# Patient Record
Sex: MALE | Race: OTHER | Employment: FULL TIME | URBAN - METROPOLITAN AREA
[De-identification: names, ages, dates, MRNs, and addresses within clinical notes are randomized per-mention and may not be internally consistent; named-entity substitution may affect disease eponyms.]

---

## 2019-05-29 ENCOUNTER — APPOINTMENT (OUTPATIENT)
Dept: GENERAL RADIOLOGY | Facility: HOSPITAL | Age: 54
DRG: 603 | End: 2019-05-29
Attending: EMERGENCY MEDICINE
Payer: COMMERCIAL

## 2019-05-29 ENCOUNTER — APPOINTMENT (OUTPATIENT)
Dept: CT IMAGING | Facility: HOSPITAL | Age: 54
DRG: 603 | End: 2019-05-29
Attending: EMERGENCY MEDICINE
Payer: COMMERCIAL

## 2019-05-29 ENCOUNTER — HOSPITAL ENCOUNTER (INPATIENT)
Facility: HOSPITAL | Age: 54
LOS: 4 days | Discharge: HOME HEALTH | DRG: 603 | End: 2019-06-03
Attending: EMERGENCY MEDICINE | Admitting: HOSPITALIST
Payer: COMMERCIAL

## 2019-05-29 DIAGNOSIS — M13.0 POLYARTHRITIS: Primary | ICD-10-CM

## 2019-05-29 DIAGNOSIS — R51.9 GENERALIZED HEADACHE: ICD-10-CM

## 2019-05-29 DIAGNOSIS — M54.2 NECK PAIN: ICD-10-CM

## 2019-05-29 PROCEDURE — 009U3ZX DRAINAGE OF SPINAL CANAL, PERCUTANEOUS APPROACH, DIAGNOSTIC: ICD-10-PCS | Performed by: EMERGENCY MEDICINE

## 2019-05-29 PROCEDURE — 70450 CT HEAD/BRAIN W/O DYE: CPT | Performed by: EMERGENCY MEDICINE

## 2019-05-29 PROCEDURE — 99222 1ST HOSP IP/OBS MODERATE 55: CPT | Performed by: HOSPITALIST

## 2019-05-29 PROCEDURE — 71045 X-RAY EXAM CHEST 1 VIEW: CPT | Performed by: EMERGENCY MEDICINE

## 2019-05-29 RX ORDER — MORPHINE SULFATE 2 MG/ML
2 INJECTION, SOLUTION INTRAMUSCULAR; INTRAVENOUS EVERY 2 HOUR PRN
Status: DISCONTINUED | OUTPATIENT
Start: 2019-05-29 | End: 2019-06-03

## 2019-05-29 RX ORDER — KETOROLAC TROMETHAMINE 30 MG/ML
30 INJECTION, SOLUTION INTRAMUSCULAR; INTRAVENOUS ONCE
Status: COMPLETED | OUTPATIENT
Start: 2019-05-29 | End: 2019-05-29

## 2019-05-29 RX ORDER — ONDANSETRON 2 MG/ML
4 INJECTION INTRAMUSCULAR; INTRAVENOUS EVERY 6 HOURS PRN
Status: DISCONTINUED | OUTPATIENT
Start: 2019-05-29 | End: 2019-06-03

## 2019-05-29 RX ORDER — SODIUM CHLORIDE 9 MG/ML
INJECTION, SOLUTION INTRAVENOUS CONTINUOUS
Status: DISCONTINUED | OUTPATIENT
Start: 2019-05-29 | End: 2019-06-03

## 2019-05-29 RX ORDER — ACETAMINOPHEN 325 MG/1
650 TABLET ORAL EVERY 6 HOURS PRN
Status: DISCONTINUED | OUTPATIENT
Start: 2019-05-29 | End: 2019-05-30

## 2019-05-29 RX ORDER — MORPHINE SULFATE 4 MG/ML
4 INJECTION, SOLUTION INTRAMUSCULAR; INTRAVENOUS EVERY 2 HOUR PRN
Status: DISCONTINUED | OUTPATIENT
Start: 2019-05-29 | End: 2019-06-03

## 2019-05-29 RX ORDER — HEPARIN SODIUM 5000 [USP'U]/ML
5000 INJECTION, SOLUTION INTRAVENOUS; SUBCUTANEOUS EVERY 12 HOURS
Status: DISCONTINUED | OUTPATIENT
Start: 2019-05-29 | End: 2019-06-03

## 2019-05-29 RX ORDER — HYDROCODONE BITARTRATE AND ACETAMINOPHEN 5; 325 MG/1; MG/1
1 TABLET ORAL EVERY 6 HOURS PRN
Status: DISCONTINUED | OUTPATIENT
Start: 2019-05-29 | End: 2019-06-03

## 2019-05-29 NOTE — ED INITIAL ASSESSMENT (HPI)
Pt rpeorts neck pain and and HA. Redness to arms and feet. Tender to touch.  Denies insect bites or wounds

## 2019-05-30 ENCOUNTER — APPOINTMENT (OUTPATIENT)
Dept: CT IMAGING | Facility: HOSPITAL | Age: 54
DRG: 603 | End: 2019-05-30
Attending: INTERNAL MEDICINE
Payer: COMMERCIAL

## 2019-05-30 ENCOUNTER — APPOINTMENT (OUTPATIENT)
Dept: GENERAL RADIOLOGY | Facility: HOSPITAL | Age: 54
DRG: 603 | End: 2019-05-30
Attending: HOSPITALIST
Payer: COMMERCIAL

## 2019-05-30 DIAGNOSIS — L03.011: Primary | ICD-10-CM

## 2019-05-30 PROCEDURE — 71260 CT THORAX DX C+: CPT | Performed by: INTERNAL MEDICINE

## 2019-05-30 PROCEDURE — 73130 X-RAY EXAM OF HAND: CPT | Performed by: HOSPITALIST

## 2019-05-30 PROCEDURE — 99233 SBSQ HOSP IP/OBS HIGH 50: CPT | Performed by: HOSPITALIST

## 2019-05-30 PROCEDURE — 74177 CT ABD & PELVIS W/CONTRAST: CPT | Performed by: INTERNAL MEDICINE

## 2019-05-30 RX ORDER — POTASSIUM CHLORIDE 20 MEQ/1
40 TABLET, EXTENDED RELEASE ORAL ONCE
Status: COMPLETED | OUTPATIENT
Start: 2019-05-30 | End: 2019-05-30

## 2019-05-30 RX ORDER — CLINDAMYCIN PHOSPHATE 900 MG/50ML
900 INJECTION INTRAVENOUS EVERY 8 HOURS
Status: DISCONTINUED | OUTPATIENT
Start: 2019-05-30 | End: 2019-06-03

## 2019-05-30 RX ORDER — INDOMETHACIN 50 MG/1
50 CAPSULE ORAL
Status: DISCONTINUED | OUTPATIENT
Start: 2019-05-30 | End: 2019-06-03

## 2019-05-30 RX ORDER — IBUPROFEN 200 MG
400 TABLET ORAL EVERY 6 HOURS PRN
Status: ON HOLD | COMMUNITY
End: 2019-06-03

## 2019-05-30 RX ORDER — 0.9 % SODIUM CHLORIDE 0.9 %
3 VIAL (ML) INJECTION AS NEEDED
Status: DISCONTINUED | OUTPATIENT
Start: 2019-05-30 | End: 2019-06-03

## 2019-05-30 RX ORDER — CEFAZOLIN SODIUM/WATER 2 G/20 ML
2 SYRINGE (ML) INTRAVENOUS EVERY 8 HOURS
Status: DISCONTINUED | OUTPATIENT
Start: 2019-05-30 | End: 2019-05-31

## 2019-05-30 RX ORDER — POVIDONE-IODINE 10 MG/G
OINTMENT TOPICAL AS NEEDED
Status: DISCONTINUED | OUTPATIENT
Start: 2019-05-30 | End: 2019-06-03

## 2019-05-30 RX ORDER — POTASSIUM CHLORIDE 20 MEQ/1
40 TABLET, EXTENDED RELEASE ORAL EVERY 4 HOURS
Status: COMPLETED | OUTPATIENT
Start: 2019-05-30 | End: 2019-05-30

## 2019-05-30 RX ORDER — ACETAMINOPHEN 325 MG/1
650 TABLET ORAL EVERY 6 HOURS PRN
Status: DISCONTINUED | OUTPATIENT
Start: 2019-05-30 | End: 2019-06-03

## 2019-05-30 NOTE — CONSULTS
Impression. ..systemic process starting with the neck  Bilateral edema with mild erythema at MP's index finger. ...seems to involve periarticular tissues without any fluid within either joint. No new recommendations from my end. Please reconsult as needed.

## 2019-05-30 NOTE — ED NOTES
Pt states has headache with neck pain. dizziness and has red, warm spot in varying areas, rt forearm, rt hand, foot. States his hands are swollen and difficult to close his fingers. Denies n/v. Fevers unsure of.  Denies insect bites to reddened area, but do

## 2019-05-30 NOTE — CONSULTS
[facility] ID CONSULT NOTE    Oneda Davidradi Patient Status:  Observation    1965 MRN [id number]   Location [facility] 5SW/SE Attending Meg Mckoy,*   Hosp Day # 0 PCP PHYSICIAN NONSTAFF       Reas clindamycin (CLEOCIN) in D5W 900mg/50ml premix, 900 mg, Intravenous, Q8H  •  ceFAZolin sodium (ANCEF/KEFZOL) 2 GM/20ML premix IV syringe 2 g, 2 g, Intravenous, Q8H  •  ondansetron HCl (ZOFRAN) injection 4 mg, 4 mg, Intravenous, Q6H PRN  •  morphINE sulfate second PIP joint edema and TTP, R first MTP joint with edema, second toenail with small abrasion, multiple healing scabs upper arms and legs  Lines: PIV+    Laboratory Data:  Recent Labs   Lab 05/30/19  0742   RBC 3.50*   HGB 11.1*   HCT 32.4*   MCV 92.6 s/p LP with 5 wbcs, normal glucose and protein  # Fever 2/2 above    PLAN:  -  Will stop vancomycin and start ancef and clindamycin.  -  Given multiple joints involved, primary to consult ortho for further evaluation.  May require further imaging.  -  Sabra

## 2019-05-30 NOTE — ED NOTES
Orders for admission, patient is aware of plan and ready to go upstairs. Any questions, please call ED RN Denis at extension [phone]. Pt is alert and oriented. Primarily Andorran speaking, but does speak and understands some english, family at bedside.  P

## 2019-05-30 NOTE — PLAN OF CARE
Problem: SAFETY ADULT - FALL  Goal: Free from fall injury  Description  INTERVENTIONS:  - Assess pt frequently for physical needs  - Identify cognitive and physical deficits and behaviors that affect risk of falls.   - [place] fall precautions as indica indicated  Outcome: Progressing    Patient a/ox4; c/o pain to hands, neck, feet 8/10, given Morphine, effective; blood cultures positive, MDs aware, placed on contact isolation; spiking temps; Plan for CT chest/abdomen/pelvis.

## 2019-05-30 NOTE — PROGRESS NOTES
[place] [facility]    Progress Note    Leslie Files Patient Status:  Observation    1965 MRN [id number]   Location [facility] 5SW/SE Attending Wali Mckoy,*   Hosp Day # 0 PCP PHYSICIAN NONSTAFF        Subjective: monitor.   ID has been consulted       Severe great toe arthritis will watch on indocin pt states no hx ulcers    Poss gout no hx per pt    Prophylaxis  Subcutaneous heparin     CODE STATUS  Full     Primary care physician  New is dr Arturo Momin has apt Electronically signed on 05/30/2019 at 09:02 by MD Denice Barahona MD  5/30/2019

## 2019-05-30 NOTE — H&P
[facility] Patient Status:  Emergency    1965 MRN [id number]   Location [address] [place] [address] Attending Sachi Alvarez MD   Hosp Day # 0 PCP PHYSICIAN NONSTAFF     Date:   atraumatic. Neck:  Supple, non-tender, no carotid bruit, no jugular venous distention, no lymphadenopathy, no thyromegaly.   Respiratory:  Lungs are clear to auscultation, respirations are non-labored, breath sounds are equal, symmetrical chest wall expans

## 2019-05-30 NOTE — PROGRESS NOTES
[facility] Dosing Service    Initial Pharmacokinetic Consult for Vancomycin Dosing     Oleg Richardson is a 46year old male who is being treated for cellulitis. Pharmacy has been asked to dose Vancomycin by Dr. Yocasta Pandyaors    He has No Known Allergies.     Frederick Wooten

## 2019-05-30 NOTE — ED PROVIDER NOTES
Patient Seen in: [facility] Emergency Department    History   Patient presents with:  Neck Pain (musculoskeletal, neurologic)  Headache (neurologic)    Stated Complaint: swelling, neck pain     HPI    43-year-old male without significant past medi nerves II through XII intact. No focal motor or sensory deficits to extremities x4. Skin: warm and dry, no rashes.   Musculoskeletal: neck is stiff with tenderness with any movement        Multiple joints to bilateral upper extremities with swelling predo CBC W/ DIFFERENTIAL[[id number]]          Abnormal            Final result                 Please view results for these tests on the individual orders.    SCAN SLIDE   CELL COUNT, CSF   WEST NILE VIRUS RAPID PCR, BLOOD OR CSF   RAINBOW DRAW BLUE   PACHECO

## 2019-05-31 PROCEDURE — 99233 SBSQ HOSP IP/OBS HIGH 50: CPT | Performed by: HOSPITALIST

## 2019-05-31 RX ORDER — POTASSIUM CHLORIDE 20 MEQ/1
40 TABLET, EXTENDED RELEASE ORAL EVERY 4 HOURS
Status: COMPLETED | OUTPATIENT
Start: 2019-05-31 | End: 2019-05-31

## 2019-05-31 NOTE — PLAN OF CARE
Problem: Patient Centered Care  Goal: Patient preferences are identified and integrated in the patient's plan of care  Description  Interventions:  - What would you like us to know as we care for you? Pt lives with his wife and son, he has a dog.  He work assistive devices as appropriate  - Consider OT/PT consult to assist with strengthening/mobility  - Encourage toileting schedule  Outcome: Progressing  Pt's daughter at bedside assisting pt and refuses bed alarm, she agrees to call as needed.    Problem: ME appropriate and evaluate response  - Consider cultural and social influences on pain and pain management  - Manage/alleviate anxiety  - Utilize distraction and/or relaxation techniques  - Monitor for opioid side effects  - Notify MD/LIP if interventions un partner  - Complete POLST form as appropriate  - Assess patient's ability to be responsible for managing their own health  - Refer to Case Management Department for coordinating discharge planning if the patient needs post-hospital services based on physic

## 2019-05-31 NOTE — PAYOR COMM NOTE
--------------  ADMISSION REVIEW     Payor: [address] [place] [address] Drive #:  [id number]  Authorization Number: [id number]/A      ED Provider Notes        Patient Seen in: [facility] Emergency Department    History   Patient presents with:  Neck Pain (musculo joint and the right fifth MCP joint. There is some erythema and mild swelling noted to the middle of the right forearm. There is tenderness to palpation to these areas.   Psychiatric: patient is oriented X 3, there is no agitation    ED Course     Labs Re CULTURE   BLOOD CULTURE            MDM   PROCEDURE:    Lumbar puncture:  After verbal informed consent from patient explaining the risks including infection, bleeding, and neurologic damage, a lumbar puncture was performed after the patient was prepped and chest wall expansion. Cardiovascular:  Normal rate, regular rhythm, no murmur, no edema. Gastrointestinal:  Soft, non-tender, non-distended, normal bowel sounds, no organomegaly. Lymphatics:  No lymphadenopathy neck, axilla, groin.   Musculoskeletal: Mil started on IV vancomycin.     Bilateral forearm with erythema, warmth, edema with elbow ROM intact. Also with bilateral index finger swelling but ROM intact. Bilateral hallux swelling, erythema, warmth and able to move.  Scratches noted on forearm and shins great toe arthritis will watch on indocin pt states no hx ulcers     Poss gout no hx per pt     Prophylaxis  Subcutaneous heparin     CODE STATUS  Full     Primary care physician  New is dr Yao Ort has apt next week has not seen yet     Disposition

## 2019-05-31 NOTE — PLAN OF CARE
Problem: Patient Centered Care  Goal: Patient preferences are identified and integrated in the patient's plan of care  Description  Interventions:  - What would you like us to know as we care for you? Pt lives with his wife and son, he has a dog.  He work Progressing     Problem: METABOLIC/FLUID AND ELECTROLYTES - ADULT  Goal: Electrolytes maintained within normal limits  Description  INTERVENTIONS:  - Monitor labs and rhythm and assess patient for signs and symptoms of electrolyte imbalances  - Administer if interventions unsuccessful or patient reports new pain  - Anticipate increased pain with activity and pre-medicate as appropriate  Outcome: Progressing     Problem: RISK FOR INFECTION - ADULT  Goal: Absence of fever/infection during anticipated neutrope Progressing    Patient a/ox4; reports pain has diminished to 2/10; VSS, afebrile; \"I feel better today;\" Plan for PICC, consent obtained; up to bathroom with steady gait; family at bedside at all times. Call light within reach.

## 2019-05-31 NOTE — CM/SW NOTE
OLY notified that pt will be needing LT IV Abx and plan is to do daily infusions @ [facility] office [facility]).  Oly sent [facility] referral. Pt could also have option of coming to [facility], as pt live in [facility] - would need script & pt would need to b

## 2019-05-31 NOTE — CONSULTS
[facility]    PATIENT'S NAME: Yoan Saucedo   ATTENDING PHYSICIAN: Eduardo Mckoy MD   CONSULTING PHYSICIAN: Jay Rebollar MD   PATIENT ACCOUNT#:   [id number]    LOCATION:  [address] [place] [address] #:   [id number]       DATE OF FLORENTINO and actively mobile without any evidence of intraarticular fluid. No crepitus was noted. No other involvement was noted within the remaining joints of the hand other than some mild diffuse edema.     ASSESSMENT AND PLAN:  The patient has diffuse systemic

## 2019-05-31 NOTE — PROGRESS NOTES
[facility] ID PROGRESS NOTE    Yonas Iha Patient Status:  Inpatient    1965 MRN [id number]   Location [facility] 5SW/SE Attending Harley Mckoy [facility]   Hosp Day # 1 PCP PHYSICIAN NONSTAFF     Subjective:  Awake, feeling better today.  Fa trim his toenails last week and did have some bleeding. No sick contacts at home. Lives with wife and son. One dog. No recent travel and he is [place]. No IVDU.  On arrival, Tmax 102.4, wbc 13.3, head CT unremarkable, CXR unremarkable, s/p LP with 5 wbcs, 1

## 2019-05-31 NOTE — PROGRESS NOTES
[place] [facility]    Progress Note    Alma Delia Negron Patient Status:  Inpatient    1965 MRN [id number]   Location [facility] 5SW/SE Attending Enoch Mckoy Day # 1 PCP PHYSICIAN NONSTAFF        Subjective:     C great toe arthritis will watch on indocin pt states no hx ulcers better     Poss gout no hx per pt     Prophylaxis  Subcutaneous heparin     CODE STATUS  Full     Primary care physician  New is dr Antonette Connolly has apt next week has not seen yet     Dispo Rani Resendez MD on 5/29/2019 at 19:53          Ct Chest+abdomen+pelvis(all Cntrst Only)(cpt=71260/75762)    Result Date: 5/30/2019  CONCLUSION:  1. Fatty liver. 2. Normal appendix. 3. Uncomplicated sigmoid diverticulosis.  4. Small umbilical and left inguin

## 2019-05-31 NOTE — PLAN OF CARE
Called specimen receiving, spoke w/ Mary Lou Richardson and verified specimen collection for Gonorrhoe (urine). Mary Lou Richardson said it's ok to collect sample in a sterile cup-specimen collected/sent per PCT.

## 2019-06-01 ENCOUNTER — APPOINTMENT (OUTPATIENT)
Dept: PICC SERVICES | Facility: HOSPITAL | Age: 54
DRG: 603 | End: 2019-06-01
Attending: HOSPITALIST
Payer: COMMERCIAL

## 2019-06-01 PROCEDURE — 02HV33Z INSERTION OF INFUSION DEVICE INTO SUPERIOR VENA CAVA, PERCUTANEOUS APPROACH: ICD-10-PCS | Performed by: HOSPITALIST

## 2019-06-01 PROCEDURE — 99233 SBSQ HOSP IP/OBS HIGH 50: CPT | Performed by: HOSPITALIST

## 2019-06-01 RX ORDER — LIDOCAINE HYDROCHLORIDE 10 MG/ML
0.5 INJECTION, SOLUTION INFILTRATION; PERINEURAL ONCE AS NEEDED
Status: ACTIVE | OUTPATIENT
Start: 2019-06-01 | End: 2019-06-01

## 2019-06-01 RX ORDER — POTASSIUM CHLORIDE 20 MEQ/1
40 TABLET, EXTENDED RELEASE ORAL ONCE
Status: COMPLETED | OUTPATIENT
Start: 2019-06-01 | End: 2019-06-01

## 2019-06-01 RX ORDER — SODIUM CHLORIDE 0.9 % (FLUSH) 0.9 %
10 SYRINGE (ML) INJECTION AS NEEDED
Status: DISCONTINUED | OUTPATIENT
Start: 2019-06-01 | End: 2019-06-03

## 2019-06-01 NOTE — PLAN OF CARE
Problem: SAFETY ADULT - FALL  Goal: Free from fall injury  Description  INTERVENTIONS:  - Assess pt frequently for physical needs  - Identify cognitive and physical deficits and behaviors that affect risk of falls.   - Britt fall precautions as indica Implement neutropenic guidelines  Outcome: Progressing   Pt alert and oriented not in distress, denies pain, PICC line place today on the left upper arm single lumen. No adverse reaction noted, will continue to monitor.

## 2019-06-01 NOTE — PROGRESS NOTES
[facility]    Progress Note    Laura Trejo Patient Status:  Inpatient    1965 MRN [id number]   Location [facility] 5SW/SE Attending Enoch Mckoy Day # 2 PCP PHYSICIAN NONSTAFF        Subjective:     C will continue to monitor.          Severe great toe arthritis will watch on indocin pt states no hx ulcers better     Poss gout no hx per pt     Prophylaxis  Subcutaneous heparin     CODE STATUS  Full     Primary care physician  New is dr Hilary goss

## 2019-06-01 NOTE — PLAN OF CARE
Problem: Patient Centered Care  Goal: Patient preferences are identified and integrated in the patient's plan of care  Description  Interventions:  - What would you like us to know as we care for you? Pt lives with his wife and son, he has a dog.  He work Progressing     Problem: METABOLIC/FLUID AND ELECTROLYTES - ADULT  Goal: Electrolytes maintained within normal limits  Description  INTERVENTIONS:  - Monitor labs and rhythm and assess patient for signs and symptoms of electrolyte imbalances  - Administer if interventions unsuccessful or patient reports new pain  - Anticipate increased pain with activity and pre-medicate as appropriate  Outcome: Progressing     Problem: RISK FOR INFECTION - ADULT  Goal: Absence of fever/infection during anticipated neutrope Discussed care with patient and family at bedside with patient's permission. Patient/family verbalize understanding of care, patient declined . Son at bedside overnight. Patient denying any new symptoms.   Patient states, \"I'm feeling better\

## 2019-06-01 NOTE — CM/SW NOTE
1258pm:   Script received for cefazolin q8 x13 days. ROSELIA sent script to [address] [place] [address] for review. ROSELIA updated MD and pt. Pt stated he is comfortable w/ doing home IVAB.  ROSELIA explained that pt may have out of pocket expenses pending insu

## 2019-06-01 NOTE — PROGRESS NOTES
[facility] ID PROGRESS NOTE    Claudia Mcgee Patient Status:  Inpatient    1965 MRN [id number]   Location [facility] 5SW/SE Attending Ina Mckoy [facility]   Hosp Day # 2 PCP PHYSICIAN NONSTAFF     Subjective:  Afeb. Awake and alert.  Resting R arm and hand pain. Has multiple scabs on body, states he does pick at some wounds. Did trim his toenails last week and did have some bleeding. No sick contacts at home. Lives with wife and son. One dog. No recent travel and he is [place]. No IVDU.  On arr

## 2019-06-02 PROCEDURE — 99232 SBSQ HOSP IP/OBS MODERATE 35: CPT | Performed by: HOSPITALIST

## 2019-06-02 RX ORDER — SODIUM CHLORIDE 9 MG/ML
INJECTION, SOLUTION INTRAVENOUS
Status: DISPENSED
Start: 2019-06-02 | End: 2019-06-02

## 2019-06-02 NOTE — PROGRESS NOTES
[facility]    Progress Note    Rosy Martínez Patient Status:  Inpatient    1965 MRN [id number]   Location [facility] 5SW/SE Attending Enoch Mckoy Day # 3 PCP PHYSICIAN NONSTAFF        Subjective:     C monitor.          Severe great toe arthritis will watch on indocin pt states no hx ulcers better     Poss gout no hx per pt     Prophylaxis  Subcutaneous heparin     CODE STATUS  Full     Primary care physician  New is dr Andrez gary has apt next week h

## 2019-06-02 NOTE — PLAN OF CARE
Problem: Patient Centered Care  Goal: Patient preferences are identified and integrated in the patient's plan of care  Description  Interventions:  - What would you like us to know as we care for you? Pt lives with his wife and son, he has a dog.  He work Progressing     Problem: METABOLIC/FLUID AND ELECTROLYTES - ADULT  Goal: Electrolytes maintained within normal limits  Description  INTERVENTIONS:  - Monitor labs and rhythm and assess patient for signs and symptoms of electrolyte imbalances  - Administer if interventions unsuccessful or patient reports new pain  - Anticipate increased pain with activity and pre-medicate as appropriate  Outcome: Progressing     Problem: RISK FOR INFECTION - ADULT  Goal: Absence of fever/infection during anticipated neutrope

## 2019-06-02 NOTE — PLAN OF CARE
Problem: SAFETY ADULT - FALL  Goal: Free from fall injury  Description  INTERVENTIONS:  - Assess pt frequently for physical needs  - Identify cognitive and physical deficits and behaviors that affect risk of falls.   - [place] fall precautions as indica isolation precautions maintained.

## 2019-06-03 VITALS
HEART RATE: 81 BPM | RESPIRATION RATE: 18 BRPM | DIASTOLIC BLOOD PRESSURE: 68 MMHG | OXYGEN SATURATION: 99 % | SYSTOLIC BLOOD PRESSURE: 120 MMHG | WEIGHT: 175.69 LBS | HEIGHT: 66 IN | TEMPERATURE: 98 F | BODY MASS INDEX: 28.23 KG/M2

## 2019-06-03 PROCEDURE — 99239 HOSP IP/OBS DSCHRG MGMT >30: CPT | Performed by: HOSPITALIST

## 2019-06-03 RX ORDER — HYDROCODONE BITARTRATE AND ACETAMINOPHEN 5; 325 MG/1; MG/1
1 TABLET ORAL EVERY 6 HOURS PRN
Qty: 10 TABLET | Refills: 0 | Status: SHIPPED | OUTPATIENT
Start: 2019-06-03

## 2019-06-03 RX ORDER — INDOMETHACIN 50 MG/1
50 CAPSULE ORAL 3 TIMES DAILY PRN
Qty: 12 CAPSULE | Refills: 0 | Status: SHIPPED | OUTPATIENT
Start: 2019-06-03

## 2019-06-03 RX ORDER — 0.9 % SODIUM CHLORIDE 0.9 %
20 VIAL (ML) INJECTION AS NEEDED
Status: DISCONTINUED | OUTPATIENT
Start: 2019-06-03 | End: 2019-06-03

## 2019-06-03 RX ORDER — CEFAZOLIN SODIUM/WATER 2 G/20 ML
2 SYRINGE (ML) INTRAVENOUS EVERY 8 HOURS
Status: DISCONTINUED | OUTPATIENT
Start: 2019-06-03 | End: 2019-06-03

## 2019-06-03 RX ORDER — ACETAMINOPHEN 325 MG/1
650 TABLET ORAL EVERY 6 HOURS PRN
Qty: 30 TABLET | Refills: 0 | Status: SHIPPED | OUTPATIENT
Start: 2019-06-03

## 2019-06-03 RX ORDER — POTASSIUM CHLORIDE 20 MEQ/1
40 TABLET, EXTENDED RELEASE ORAL ONCE
Status: COMPLETED | OUTPATIENT
Start: 2019-06-03 | End: 2019-06-03

## 2019-06-03 RX ORDER — CLOTRIMAZOLE AND BETAMETHASONE DIPROPIONATE 10; .5 MG/ML; MG/ML
1 LOTION TOPICAL 2 TIMES DAILY
Qty: 1 BOTTLE | Refills: 0 | Status: SHIPPED | OUTPATIENT
Start: 2019-06-03

## 2019-06-03 NOTE — DISCHARGE SUMMARY
Dc summary#[id number]  > 30 min spent on [facility] Discharge Diagnoses: cellulitis    Lace+ Score: 31  59-90 High Risk  29-58 Medium Risk  0-28   Low Risk. TCM Follow-Up Recommendation:  LACE 29-58:  Moderate Risk of readmission after discharge from t

## 2019-06-03 NOTE — CM/SW NOTE
Infusion:  [facility] - unable to accept. Per Ginny, [facility] should be in-network w/ pt's insurance. SW placed referral to this agency.     Home Health:  [facility] unable to accept  [address] to see if they are in-network or not    12:00PM: [facility]

## 2019-06-03 NOTE — PROGRESS NOTES
[facility] ID PROGRESS NOTE    Florencericco Panchal Patient Status:  Inpatient    1965 MRN [id number]   Location [facility] 5SW/SE Attending Minh Mckoy [facility]   Hosp Day # 4 PCP PHYSICIAN NONSTAFF     Subjective:  Afeb. Awake and alert.  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 Was outside a warehouse this past weekend where he may have been bitten by mosquitos. Complains of R arm and hand pain. Has multiple scabs on body, states he does pick at some wounds.  Did trim his toenails la

## 2019-06-03 NOTE — PLAN OF CARE
Problem: Patient Centered Care  Goal: Patient preferences are identified and integrated in the patient's plan of care  Description  Interventions:  - What would you like us to know as we care for you? Pt lives with his wife and son, he has a dog.  He work toileting schedule  Outcome: Adequate for Discharge     Problem: METABOLIC/FLUID AND ELECTROLYTES - ADULT  Goal: Electrolytes maintained within normal limits  Description  INTERVENTIONS:  - Monitor labs and rhythm and assess patient for signs and symptoms relaxation techniques  - Monitor for opioid side effects  - Notify MD/LIP if interventions unsuccessful or patient reports new pain  - Anticipate increased pain with activity and pre-medicate as appropriate  Outcome: Adequate for Discharge     Problem: RIS needs related to functional status, cognitive ability or social support system  Outcome: Adequate for Discharge    Patient a/ox4; denies pain; VSS; PICC line intact; first 2 doses of Ancef administered, tolerated well; per I/D Dr. Carbajal Client, administer 2000

## 2019-06-03 NOTE — PLAN OF CARE
Problem: Patient Centered Care  Goal: Patient preferences are identified and integrated in the patient's plan of care  Description  Interventions:  - What would you like us to know as we care for you? Pt lives with his wife and son, he has a dog.  He work appropriate  - Consider OT/PT consult to assist with strengthening/mobility  - Encourage toileting schedule  Outcome: Progressing  Pt is self care, daughter at bedside assisting pt.   Problem: METABOLIC/FLUID AND ELECTROLYTES - ADULT  Goal: Electrolytes jimmy response  - Consider cultural and social influences on pain and pain management  - Manage/alleviate anxiety  - Utilize distraction and/or relaxation techniques  - Monitor for opioid side effects  - Notify MD/LIP if interventions unsuccessful or patient rep Complete POLST form as appropriate  - Assess patient's ability to be responsible for managing their own health  - Refer to Case Management Department for coordinating discharge planning if the patient needs post-hospital services based on physician/LIP ord

## 2019-06-04 NOTE — DISCHARGE SUMMARY
[facility]    PATIENT'S NAME: Tere Mancilla   ATTENDING PHYSICIAN: Eduardo Mckoy MD   PATIENT ACCOUNT#:   [id number]    LOCATION:  [address] [place] [address] RECORD #:   [id number]       YOB: 1965  ADMISSION DATE:       05/29/2 EXTREMITIES:  His right arm is less erythematous. His joints are freely mobile. He has pretty severe arthritis of his large toes, seems to be better with Indocin. NEUROLOGIC:  He is alert, oriented, very friendly, and cooperative.       LABORATORY ST

## 2019-06-11 ENCOUNTER — LAB REQUISITION (OUTPATIENT)
Dept: LAB | Facility: HOSPITAL | Age: 54
End: 2019-06-11
Payer: COMMERCIAL

## 2019-06-11 DIAGNOSIS — B95.0 STREPTOCOCCUS, GROUP A, AS THE CAUSE OF DISEASES CLASSIFIED ELSEWHERE: ICD-10-CM

## 2019-06-11 PROCEDURE — 85025 COMPLETE CBC W/AUTO DIFF WBC: CPT | Performed by: INTERNAL MEDICINE

## 2019-06-11 PROCEDURE — 85060 BLOOD SMEAR INTERPRETATION: CPT | Performed by: INTERNAL MEDICINE

## 2019-06-11 PROCEDURE — 80053 COMPREHEN METABOLIC PANEL: CPT | Performed by: INTERNAL MEDICINE

## 2025-04-09 ENCOUNTER — LAB ENCOUNTER (OUTPATIENT)
Dept: LAB | Age: 60
End: 2025-04-09
Attending: FAMILY MEDICINE
Payer: COMMERCIAL

## 2025-04-09 ENCOUNTER — OFFICE VISIT (OUTPATIENT)
Age: 60
End: 2025-04-09
Payer: COMMERCIAL

## 2025-04-09 VITALS
OXYGEN SATURATION: 98 % | HEART RATE: 70 BPM | BODY MASS INDEX: 23.8 KG/M2 | HEIGHT: 63.5 IN | SYSTOLIC BLOOD PRESSURE: 102 MMHG | DIASTOLIC BLOOD PRESSURE: 60 MMHG | WEIGHT: 136 LBS | TEMPERATURE: 98 F

## 2025-04-09 DIAGNOSIS — R04.0 EPISTAXIS: ICD-10-CM

## 2025-04-09 DIAGNOSIS — Z12.11 COLON CANCER SCREENING: ICD-10-CM

## 2025-04-09 DIAGNOSIS — Z00.00 HEALTHCARE MAINTENANCE: ICD-10-CM

## 2025-04-09 DIAGNOSIS — R63.4 WEIGHT LOSS, UNINTENTIONAL: ICD-10-CM

## 2025-04-09 DIAGNOSIS — F14.91 HISTORY OF COCAINE USE: ICD-10-CM

## 2025-04-09 DIAGNOSIS — F10.11 HISTORY OF ALCOHOL ABUSE: ICD-10-CM

## 2025-04-09 DIAGNOSIS — G43.701 CHRONIC MIGRAINE WITHOUT AURA WITH STATUS MIGRAINOSUS, NOT INTRACTABLE: Primary | ICD-10-CM

## 2025-04-09 DIAGNOSIS — F51.04 PSYCHOPHYSIOLOGICAL INSOMNIA: ICD-10-CM

## 2025-04-09 LAB
ALBUMIN SERPL-MCNC: 4.5 G/DL (ref 3.2–4.8)
ALBUMIN/GLOB SERPL: 1.3 {RATIO} (ref 1–2)
ALP LIVER SERPL-CCNC: 127 U/L (ref 45–117)
ALT SERPL-CCNC: 11 U/L (ref 10–49)
ANION GAP SERPL CALC-SCNC: 8 MMOL/L (ref 0–18)
AST SERPL-CCNC: 19 U/L (ref ?–34)
BASOPHILS # BLD AUTO: 0.13 X10(3) UL (ref 0–0.2)
BASOPHILS NFR BLD AUTO: 1.6 %
BILIRUB SERPL-MCNC: 0.3 MG/DL (ref 0.2–1.1)
BILIRUB UR QL: NEGATIVE
BUN BLD-MCNC: 10 MG/DL (ref 9–23)
BUN/CREAT SERPL: 10.4 (ref 10–20)
CALCIUM BLD-MCNC: 9.1 MG/DL (ref 8.7–10.4)
CHLORIDE SERPL-SCNC: 104 MMOL/L (ref 98–112)
CHOLEST SERPL-MCNC: 167 MG/DL (ref ?–200)
CLARITY UR: CLEAR
CO2 SERPL-SCNC: 26 MMOL/L (ref 21–32)
COLOR UR: YELLOW
COMPLEXED PSA SERPL-MCNC: 0.59 NG/ML (ref ?–4)
CREAT BLD-MCNC: 0.96 MG/DL (ref 0.7–1.3)
CRP SERPL-MCNC: <0.4 MG/DL (ref ?–1)
DEPRECATED RDW RBC AUTO: 41.9 FL (ref 35.1–46.3)
EGFRCR SERPLBLD CKD-EPI 2021: 90 ML/MIN/1.73M2 (ref 60–?)
EOSINOPHIL # BLD AUTO: 0.49 X10(3) UL (ref 0–0.7)
EOSINOPHIL NFR BLD AUTO: 6 %
ERYTHROCYTE [DISTWIDTH] IN BLOOD BY AUTOMATED COUNT: 16.3 % (ref 11–15)
ERYTHROCYTE [SEDIMENTATION RATE] IN BLOOD: 109 MM/HR (ref 0–20)
FASTING PATIENT LIPID ANSWER: NO
FASTING STATUS PATIENT QL REPORTED: NO
GLOBULIN PLAS-MCNC: 3.5 G/DL (ref 2–3.5)
GLUCOSE BLD-MCNC: 99 MG/DL (ref 70–99)
GLUCOSE UR-MCNC: NORMAL MG/DL
HCT VFR BLD AUTO: 26.3 % (ref 39–53)
HDLC SERPL-MCNC: 38 MG/DL (ref 40–59)
HGB BLD-MCNC: 7.9 G/DL (ref 13–17.5)
HGB UR QL STRIP.AUTO: NEGATIVE
IMM GRANULOCYTES # BLD AUTO: 0.03 X10(3) UL (ref 0–1)
IMM GRANULOCYTES NFR BLD: 0.4 %
INR BLD: 1.04 (ref 0.8–1.2)
KETONES UR-MCNC: NEGATIVE MG/DL
LDLC SERPL CALC-MCNC: 105 MG/DL (ref ?–100)
LEUKOCYTE ESTERASE UR QL STRIP.AUTO: NEGATIVE
LYMPHOCYTES # BLD AUTO: 2.4 X10(3) UL (ref 1–4)
LYMPHOCYTES NFR BLD AUTO: 29.3 %
MCH RBC QN AUTO: 21.2 PG (ref 26–34)
MCHC RBC AUTO-ENTMCNC: 30 G/DL (ref 31–37)
MCV RBC AUTO: 70.5 FL (ref 80–100)
MONOCYTES # BLD AUTO: 0.98 X10(3) UL (ref 0.1–1)
MONOCYTES NFR BLD AUTO: 12 %
NEUTROPHILS # BLD AUTO: 4.15 X10 (3) UL (ref 1.5–7.7)
NEUTROPHILS # BLD AUTO: 4.15 X10(3) UL (ref 1.5–7.7)
NEUTROPHILS NFR BLD AUTO: 50.7 %
NITRITE UR QL STRIP.AUTO: NEGATIVE
NONHDLC SERPL-MCNC: 129 MG/DL (ref ?–130)
OSMOLALITY SERPL CALC.SUM OF ELEC: 285 MOSM/KG (ref 275–295)
PH UR: 6.5 [PH] (ref 5–8)
PLATELET # BLD AUTO: 445 10(3)UL (ref 150–450)
POTASSIUM SERPL-SCNC: 3.8 MMOL/L (ref 3.5–5.1)
PROT SERPL-MCNC: 8 G/DL (ref 5.7–8.2)
PROT UR-MCNC: NEGATIVE MG/DL
PROTHROMBIN TIME: 14.3 SECONDS (ref 11.6–14.8)
RBC # BLD AUTO: 3.73 X10(6)UL (ref 4.3–5.7)
SODIUM SERPL-SCNC: 138 MMOL/L (ref 136–145)
SP GR UR STRIP: 1.02 (ref 1–1.03)
TRIGL SERPL-MCNC: 134 MG/DL (ref 30–149)
TSI SER-ACNC: 1.84 UIU/ML (ref 0.55–4.78)
UROBILINOGEN UR STRIP-ACNC: NORMAL
VLDLC SERPL CALC-MCNC: 23 MG/DL (ref 0–30)
WBC # BLD AUTO: 8.2 X10(3) UL (ref 4–11)

## 2025-04-09 PROCEDURE — 87340 HEPATITIS B SURFACE AG IA: CPT

## 2025-04-09 PROCEDURE — 83540 ASSAY OF IRON: CPT | Performed by: FAMILY MEDICINE

## 2025-04-09 PROCEDURE — 84466 ASSAY OF TRANSFERRIN: CPT | Performed by: FAMILY MEDICINE

## 2025-04-09 PROCEDURE — 86708 HEPATITIS A ANTIBODY: CPT

## 2025-04-09 PROCEDURE — 85610 PROTHROMBIN TIME: CPT

## 2025-04-09 PROCEDURE — 86140 C-REACTIVE PROTEIN: CPT

## 2025-04-09 PROCEDURE — 87389 HIV-1 AG W/HIV-1&-2 AB AG IA: CPT

## 2025-04-09 PROCEDURE — 99205 OFFICE O/P NEW HI 60 MIN: CPT | Performed by: FAMILY MEDICINE

## 2025-04-09 PROCEDURE — 85652 RBC SED RATE AUTOMATED: CPT

## 2025-04-09 PROCEDURE — 85025 COMPLETE CBC W/AUTO DIFF WBC: CPT | Performed by: FAMILY MEDICINE

## 2025-04-09 PROCEDURE — 83036 HEMOGLOBIN GLYCOSYLATED A1C: CPT

## 2025-04-09 PROCEDURE — 84443 ASSAY THYROID STIM HORMONE: CPT

## 2025-04-09 PROCEDURE — 3074F SYST BP LT 130 MM HG: CPT | Performed by: FAMILY MEDICINE

## 2025-04-09 PROCEDURE — 80061 LIPID PANEL: CPT

## 2025-04-09 PROCEDURE — 3008F BODY MASS INDEX DOCD: CPT | Performed by: FAMILY MEDICINE

## 2025-04-09 PROCEDURE — 81003 URINALYSIS AUTO W/O SCOPE: CPT

## 2025-04-09 PROCEDURE — 86803 HEPATITIS C AB TEST: CPT

## 2025-04-09 PROCEDURE — 80503 PATH CLIN CONSLTJ SF 5-20: CPT

## 2025-04-09 PROCEDURE — 36415 COLL VENOUS BLD VENIPUNCTURE: CPT | Performed by: FAMILY MEDICINE

## 2025-04-09 PROCEDURE — 86706 HEP B SURFACE ANTIBODY: CPT

## 2025-04-09 PROCEDURE — 86709 HEPATITIS A IGM ANTIBODY: CPT

## 2025-04-09 PROCEDURE — 3078F DIAST BP <80 MM HG: CPT | Performed by: FAMILY MEDICINE

## 2025-04-09 PROCEDURE — 86038 ANTINUCLEAR ANTIBODIES: CPT

## 2025-04-09 PROCEDURE — 80053 COMPREHEN METABOLIC PANEL: CPT

## 2025-04-09 PROCEDURE — 82728 ASSAY OF FERRITIN: CPT | Performed by: FAMILY MEDICINE

## 2025-04-09 PROCEDURE — 86704 HEP B CORE ANTIBODY TOTAL: CPT

## 2025-04-09 RX ORDER — FLUTICASONE PROPIONATE 50 MCG
2 SPRAY, SUSPENSION (ML) NASAL DAILY
Qty: 1 EACH | Refills: 0 | Status: SHIPPED | OUTPATIENT
Start: 2025-04-09

## 2025-04-09 RX ORDER — LEVOCETIRIZINE DIHYDROCHLORIDE 5 MG/1
5 TABLET, FILM COATED ORAL NIGHTLY PRN
Qty: 90 TABLET | Refills: 0 | Status: SHIPPED | OUTPATIENT
Start: 2025-04-09

## 2025-04-09 RX ORDER — MIRTAZAPINE 15 MG/1
15 TABLET, FILM COATED ORAL NIGHTLY
Qty: 90 TABLET | Refills: 0 | Status: SHIPPED | OUTPATIENT
Start: 2025-04-09

## 2025-04-09 RX ORDER — TOPIRAMATE 50 MG/1
50 TABLET, FILM COATED ORAL DAILY
Qty: 90 TABLET | Refills: 0 | Status: SHIPPED | OUTPATIENT
Start: 2025-04-09

## 2025-04-09 NOTE — PROGRESS NOTES
HPI:     Chief Complaint   Patient presents with    Headache     Daily headaches x2-3 years    Insomnia    Weight Loss       Rah Leonard is a 59 year old male presenting for:      Headache  -daily for 2-3yrs  -frontal and back. Sometimes in neck  -last few min to hours  -no vertigo, vomiting but some lightheaded and nausea  -some light sensitivity  -tylenol helps some  -not awawkened by sleep  -no vision changes    Having insomnia for past 2-3yr ; on average sleeps 4hrs. Wakes up often and struggles to fall asleep. Feels anxious. Sometimes sad but denies any current SI/HI. About 7 years did have an attempt but none since    Used to have drink alochol heavily in past and also cocaine depoendence. But stopped 3yrs ago per pt    Has chronic congestion and rhinitis and told in past has issues with septum. For past 1yr, has nose bleeds 2-3x/month and stops after 5-10min of pressure. Keeps filling an itch on his nose    Weight loss-> about 6mo ago was 155lbs. Lost 20lbs in 6mo. Unintentional.   No f/c, nightsweats  Poor appetite    Results for orders placed or performed in visit on 06/11/19   Comp Metabolic Panel (14)    Collection Time: 06/11/19 12:32 PM   Result Value Ref Range    Glucose 134 (H) 70 - 99 mg/dL    Sodium 136 136 - 145 mmol/L    Potassium 3.6 3.5 - 5.1 mmol/L    Chloride 106 98 - 112 mmol/L    CO2 23.0 21.0 - 32.0 mmol/L    Anion Gap 7 0 - 18 mmol/L    BUN 12 7 - 18 mg/dL    Creatinine 1.02 0.70 - 1.30 mg/dL    BUN/CREA Ratio 11.8 10.0 - 20.0    Calcium, Total 8.8 8.5 - 10.1 mg/dL    Calculated Osmolality 284 275 - 295 mOsm/kg    GFR, Non- 83 >=60    GFR, -American 96 >=60    ALT 13 (L) 16 - 61 U/L    AST 26 15 - 37 U/L    Alkaline Phosphatase 125 (H) 45 - 117 U/L    Bilirubin, Total 0.2 0.1 - 2.0 mg/dL    Total Protein 9.7 (H) 6.4 - 8.2 g/dL    Albumin 2.7 (L) 3.4 - 5.0 g/dL    Globulin  7.0 (H) 2.8 - 4.4 g/dL    A/G Ratio 0.4 (L) 1.0 - 2.0   MD BLOOD SMEAR CONSULT     Collection Time: 06/11/19 12:32 PM   Result Value Ref Range    MD Blood Smear Consult       Evaluation of the CBC with differential data and the peripheral blood smear demonstrates mild normocytic anemia and thrombocytosis.  Red blood cells demonstrate minimal nasal poikilocytosis, with occasional acanthocytes.  Platelets are increased in number with occasional large and giant forms.  There are no significant morphologic abnormalities in the white blood cells.    Differential causes for an anemia of this type may include chronic inflammatory disorders (rheumatoid arthritis, SLE, inflammatory bowel disease, sarcoidosis, trauma, etc.), chronic infections (HIV, other viral infections, tuberculosis, pyelonephritis, osteomyelitis, chronic fungal infections, subacute bacterial endocarditis, etc.), neoplasms  (lymphoma, carcinomas, chronic leukemias and occasionally myelodysplastic syndrome), hemolysis/hemorrhage, and end organ failure (chronic liver disease, endocrinopathies, etc.). Clinical correlation is recommended.     Differential causes of thrombocytosis may include  hemorrhage, chronic iron deficiency, postsplenectomy (temporary), chronic inflammatory/infectious states (collagen vascular disorders, ulcerative colitis, tuberculosis, other), drug effects, rebound effect from previous thrombocytopenia, asplenia (anatomic or functional), and some neoplastic conditions (chronic myeloproliferative disease, others). Clinical correlation is recommended.    Reviewed by Claribel Marie M.D.     CBC W/ DIFFERENTIAL    Collection Time: 06/11/19 12:32 PM   Result Value Ref Range    WBC 10.3 4.0 - 11.0 x10(3) uL    RBC 3.55 (L) 4.30 - 5.70 x10(6)uL    HGB 11.0 (L) 13.0 - 17.5 g/dL    HCT 33.9 (L) 39.0 - 53.0 %    MCV 95.5 80.0 - 100.0 fL    MCH 31.0 26.0 - 34.0 pg    MCHC 32.4 31.0 - 37.0 g/dL    RDW-SD 47.3 (H) 35.1 - 46.3 fL    RDW 13.5 11.0 - 15.0 %    .0 (H) 150.0 - 450.0 10(3)uL    Neutrophil Absolute Prelim 5.68  1.50 - 7.70 x10 (3) uL    Neutrophil Absolute 5.68 1.50 - 7.70 x10(3) uL    Lymphocyte Absolute 3.42 1.00 - 4.00 x10(3) uL    Monocyte Absolute 0.90 0.10 - 1.00 x10(3) uL    Eosinophil Absolute 0.13 0.00 - 0.70 x10(3) uL    Basophil Absolute 0.11 0.00 - 0.20 x10(3) uL    Immature Granulocyte Absolute 0.09 0.00 - 1.00 x10(3) uL    Neutrophil % 54.9 %    Lymphocyte % 33.1 %    Monocyte % 8.7 %    Eosinophil % 1.3 %    Basophil % 1.1 %    Immature Granulocyte % 0.9 %       Labs:   No results found for: \"A1C\"   No results found for: \"CHOLEST\", \"HDL\", \"TRIG\", \"LDL\", \"NONHDLC\"    Lab Results   Component Value Date/Time     (H) 06/11/2019 12:32 PM     06/11/2019 12:32 PM    K 3.6 06/11/2019 12:32 PM     06/11/2019 12:32 PM    CO2 23.0 06/11/2019 12:32 PM    CREATSERUM 1.02 06/11/2019 12:32 PM    CA 8.8 06/11/2019 12:32 PM    ALB 2.7 (L) 06/11/2019 12:32 PM    TP 9.7 (H) 06/11/2019 12:32 PM    ALKPHO 125 (H) 06/11/2019 12:32 PM    AST 26 06/11/2019 12:32 PM    ALT 13 (L) 06/11/2019 12:32 PM    BILT 0.2 06/11/2019 12:32 PM          Medications:  Current Medications[1]   Past Medical History[2]      Past Surgical History[3]  Allergies[4]   Social History:  Short Social Hx on File[5]   Family History:  Family History[6]       REVIEW OF SYSTEMS:   Review of Systems   Constitutional:  Positive for unexpected weight change. Negative for fatigue and fever.   HENT:  Positive for congestion and nosebleeds.    Respiratory:  Negative for cough and shortness of breath.    Cardiovascular:  Negative for chest pain, palpitations and leg swelling.   Gastrointestinal:  Negative for abdominal pain, constipation, diarrhea and vomiting.   Musculoskeletal:  Negative for arthralgias.   Neurological:  Positive for headaches.   Psychiatric/Behavioral:  Positive for dysphoric mood and sleep disturbance. The patient is nervous/anxious.             PHYSICAL EXAM:   /60   Pulse 70   Temp 98.2 °F (36.8 °C)   Ht 5' 3.5\"  (1.613 m)   Wt 136 lb (61.7 kg)   SpO2 98%   BMI 23.71 kg/m²  Estimated body mass index is 23.71 kg/m² as calculated from the following:    Height as of this encounter: 5' 3.5\" (1.613 m).    Weight as of this encounter: 136 lb (61.7 kg).     Wt Readings from Last 3 Encounters:   04/09/25 136 lb (61.7 kg)   05/29/19 175 lb 11.2 oz (79.7 kg)       Physical Exam  Vitals reviewed.   Constitutional:       General: He is not in acute distress.     Appearance: He is well-developed.   HENT:      Right Ear: Tympanic membrane normal.      Left Ear: Tympanic membrane normal.      Nose: Rhinorrhea present. No congestion.   Cardiovascular:      Rate and Rhythm: Normal rate and regular rhythm.      Heart sounds: Normal heart sounds. No murmur heard.  Pulmonary:      Effort: Pulmonary effort is normal. No respiratory distress.      Breath sounds: Normal breath sounds.   Abdominal:      General: Bowel sounds are normal. There is no distension.      Palpations: Abdomen is soft.      Tenderness: There is no abdominal tenderness.   Musculoskeletal:      Right lower leg: No edema.      Left lower leg: No edema.   Neurological:      General: No focal deficit present.      Cranial Nerves: No cranial nerve deficit.      Sensory: No sensory deficit.      Motor: No weakness.      Coordination: Coordination normal.      Gait: Gait normal.             ASSESSMENT AND PLAN:   Patient is a 59 year old male who presents primarily presents for:    Diagnoses and all orders for this visit:    Chronic migraine without aura with status migrainosus, not intractable  -     CT BRAIN OR HEAD (CPT=70450); Future  -     topiramate (TOPAMAX) 50 MG Oral Tab; Take 1 tablet (50 mg total) by mouth daily.  -susepct secondary to poor nutrition and insomnia  -obtain CT  -topamax trial. SE discussed  -bloodwork ordered  -supportive care PRN    Psychophysiological insomnia  -     mirtazapine 15 MG Oral Tab; Take 1 tablet (15 mg total) by mouth  nightly.  -supportive care PRN  -SE discussed    Epistaxis  History of cocaine use  History of alcohol abuse  -     ENT Referral - [facility])  -     fluticasone propionate 50 MCG/ACT Nasal Suspension; 2 sprays by Each Nare route daily.  -     levocetirizine 5 MG Oral Tab; Take 1 tablet (5 mg total) by mouth nightly as needed for Allergies.  -     ENT Referral - [facility])  -     Prothrombin Time (PT) [E]; Future  -concern about platelets due to heavy alcohol use in past  -bloodwork ordered    Weight loss, unintentional  History of alcohol abuse     -     HIV Ag/Ab Combo; Future  -     C-Reactive Protein [E]; Future  -     Sed Rate, Westergren (Automated) [E]; Future  -     FEDERICO, Direct, with Reflex Titer + Spec AB ([facility] only); Future  -     Gastro Referral - In [facility]  -     Hepatitis A B + C profile [E]; Future  -given hx of alcohol and drug abuse concern for  cirrhosis   -r/o cancer with GI and also doing PSA  -may need to do CXR pending results  -discussed nutritional needs    Colon cancer screening  -     Gastro Referral - In [facility]    Healthcare maintenance  -     CBC With Differential With Platelet  -     Comp Metabolic Panel (14); Future  -     Hemoglobin A1C; Future  -     Lipid Panel; Future  -     Urinalysis, Routine; Future  -     TSH W Reflex To Free T4; Future  -     PSA Total, Screen; Future          Return in about 3 months (around 7/9/2025) for physical.  Patient indicates understanding of the above recommendations and agrees to the above plan.  Reasurrance and education provided. All questions answered.  Notified to call with any questions, complications, allergies, or worsening or changing symptoms as well as any side effects or complications from the treatments .  Red flags/ ER precautions discussed.    Spent 60 minutes including chart review, reviewing appropriate medical history, evaluating patient, discussing treatment options, counseling and education  (diet and exercise), ordering appropriate diagnostic tests and completing documentation.        Meds & Refills for this Visit:  Requested Prescriptions     Signed Prescriptions Disp Refills    topiramate (TOPAMAX) 50 MG Oral Tab 90 tablet 0     Sig: Take 1 tablet (50 mg total) by mouth daily.    mirtazapine 15 MG Oral Tab 90 tablet 0     Sig: Take 1 tablet (15 mg total) by mouth nightly.    fluticasone propionate 50 MCG/ACT Nasal Suspension 1 each 0     Si sprays by Each Nare route daily.    levocetirizine 5 MG Oral Tab 90 tablet 0     Sig: Take 1 tablet (5 mg total) by mouth nightly as needed for Allergies.       Orders Placed This Encounter   Procedures    CBC With Differential With Platelet    Comp Metabolic Panel (14)    Hemoglobin A1C    Lipid Panel    Urinalysis, Routine    TSH W Reflex To Free T4    PSA Total, Screen    HIV Ag/Ab Combo    Prothrombin Time (PT) [E]    C-Reactive Protein [E]    Sed Rate, Westergren (Automated) [E]    FEDERICO, Direct, with Reflex Titer + Spec AB ([place] only)    Hepatitis A B + C profile [E]       Imaging & Consults:  ENT - INTERNAL  GASTRO - INTERNAL  CT BRAIN OR HEAD (CPT=70450)    Health Maintenance:  Health Maintenance   Topic Date Due    Annual Physical  Never done    Colorectal Cancer Screening  Never done    DTaP,Tdap,and Td Vaccines (1 - Tdap) Never done    PSA  Never done    Pneumococcal Vaccine: 50+ Years (1 of 1 - PCV) Never done    Zoster Vaccines (1 of 2) Never done    COVID-19 Vaccine ( -  season) Never done    Annual Depression Screening  2025    Influenza Vaccine (Season Ended) 10/01/2025    Meningococcal B Vaccine  Aged Out         Dilcia Thomas MD                  [1]   Current Outpatient Medications   Medication Sig Dispense Refill    topiramate (TOPAMAX) 50 MG Oral Tab Take 1 tablet (50 mg total) by mouth daily. 90 tablet 0    mirtazapine 15 MG Oral Tab Take 1 tablet (15 mg total) by mouth nightly. 90 tablet 0    fluticasone  propionate 50 MCG/ACT Nasal Suspension 2 sprays by Each Nare route daily. 1 each 0    levocetirizine 5 MG Oral Tab Take 1 tablet (5 mg total) by mouth nightly as needed for Allergies. 90 tablet 0   [2] No past medical history on file.  [3] No past surgical history on file.  [4] No Known Allergies  [5]   Social History  Socioeconomic History    Marital status:    Tobacco Use    Smoking status: Never    Smokeless tobacco: Never   Vaping Use    Vaping status: Never Used   Substance and Sexual Activity    Alcohol use: Not Currently     Comment: on weekends only, last drink Monday/holiday    Drug use: Never   [6]   Family History  Problem Relation Age of Onset    Diabetes Mother     Diabetes Father     Diabetes Sister

## 2025-04-10 LAB
EST. AVERAGE GLUCOSE BLD GHB EST-MCNC: 146 MG/DL (ref 68–126)
HAV AB SER QL IA: REACTIVE
HAV IGM SER QL: NONREACTIVE
HBA1C MFR BLD: 6.7 % (ref ?–5.7)
HBV CORE AB SERPL QL IA: NONREACTIVE
HBV SURFACE AB SER QL: NONREACTIVE
HBV SURFACE AB SERPL IA-ACNC: <3.1 MIU/ML
HBV SURFACE AG SERPL QL IA: NONREACTIVE
HCV AB SERPL QL IA: NONREACTIVE
NUCLEAR IGG TITR SER IF: NEGATIVE {TITER}

## 2025-04-11 ENCOUNTER — TELEPHONE (OUTPATIENT)
Age: 60
End: 2025-04-11

## 2025-04-11 NOTE — TELEPHONE ENCOUNTER
Pt is calling to schedule a new consult appt.  Patient Name-Horacio Monreal 11/27/1965  New Consult-  Referred by-Dr.Tiana Augustin Thomas  Reason-Iron deficiency, anemia, unspecified iron deficiency anemia type  Insurance-[facility]  Please give pt a call back. Thank you.    Pt son stated he has a hemoglobin 7.9    Referral in system

## 2025-04-17 ENCOUNTER — PATIENT OUTREACH (OUTPATIENT)
Dept: CASE MANAGEMENT | Age: 60
End: 2025-04-17

## 2025-04-17 ENCOUNTER — TELEPHONE (OUTPATIENT)
Dept: INTERNAL MEDICINE CLINIC | Facility: CLINIC | Age: 60
End: 2025-04-17

## 2025-04-17 ENCOUNTER — APPOINTMENT (OUTPATIENT)
Dept: GENERAL RADIOLOGY | Facility: HOSPITAL | Age: 60
End: 2025-04-17
Attending: EMERGENCY MEDICINE
Payer: COMMERCIAL

## 2025-04-17 ENCOUNTER — APPOINTMENT (OUTPATIENT)
Dept: CT IMAGING | Facility: HOSPITAL | Age: 60
End: 2025-04-17
Attending: EMERGENCY MEDICINE
Payer: COMMERCIAL

## 2025-04-17 ENCOUNTER — HOSPITAL ENCOUNTER (EMERGENCY)
Facility: HOSPITAL | Age: 60
Discharge: HOME | End: 2025-04-17
Attending: EMERGENCY MEDICINE
Payer: COMMERCIAL

## 2025-04-17 VITALS
RESPIRATION RATE: 18 BRPM | OXYGEN SATURATION: 99 % | DIASTOLIC BLOOD PRESSURE: 70 MMHG | HEIGHT: 64 IN | WEIGHT: 135 LBS | BODY MASS INDEX: 23.05 KG/M2 | HEART RATE: 76 BPM | SYSTOLIC BLOOD PRESSURE: 112 MMHG | TEMPERATURE: 98 F

## 2025-04-17 DIAGNOSIS — D64.9 ANEMIA, UNSPECIFIED TYPE: ICD-10-CM

## 2025-04-17 DIAGNOSIS — R42 DIZZINESS: Primary | ICD-10-CM

## 2025-04-17 DIAGNOSIS — R40.4 TRANSIENT ALTERATION OF AWARENESS: ICD-10-CM

## 2025-04-17 LAB
ALBUMIN SERPL-MCNC: 4.4 G/DL (ref 3.2–4.8)
ALBUMIN/GLOB SERPL: 1.1 {RATIO} (ref 1–2)
ALP LIVER SERPL-CCNC: 140 U/L (ref 45–117)
ALT SERPL-CCNC: 7 U/L (ref 10–49)
AMMONIA PLAS-MCNC: 22 UMOL/L (ref 11–32)
ANION GAP SERPL CALC-SCNC: 8 MMOL/L (ref 0–18)
ANTIBODY SCREEN: NEGATIVE
APTT PPP: 32 SECONDS (ref 23–36)
AST SERPL-CCNC: 18 U/L (ref ?–34)
ATRIAL RATE: 75 BPM
BASOPHILS # BLD AUTO: 0.15 X10(3) UL (ref 0–0.2)
BASOPHILS NFR BLD AUTO: 1.5 %
BILIRUB SERPL-MCNC: 0.3 MG/DL (ref 0.2–1.1)
BILIRUB UR QL: NEGATIVE
BUN BLD-MCNC: 14 MG/DL (ref 9–23)
BUN/CREAT SERPL: 14.4 (ref 10–20)
CALCIUM BLD-MCNC: 9 MG/DL (ref 8.7–10.4)
CHLORIDE SERPL-SCNC: 107 MMOL/L (ref 98–112)
CLARITY UR: CLEAR
CO2 SERPL-SCNC: 22 MMOL/L (ref 21–32)
COLOR UR: YELLOW
CREAT BLD-MCNC: 0.97 MG/DL (ref 0.7–1.3)
DEPRECATED RDW RBC AUTO: 42.3 FL (ref 35.1–46.3)
EGFRCR SERPLBLD CKD-EPI 2021: 89 ML/MIN/1.73M2 (ref 60–?)
EOSINOPHIL # BLD AUTO: 0.22 X10(3) UL (ref 0–0.7)
EOSINOPHIL NFR BLD AUTO: 2.2 %
ERYTHROCYTE [DISTWIDTH] IN BLOOD BY AUTOMATED COUNT: 16.6 % (ref 11–15)
GLOBULIN PLAS-MCNC: 4 G/DL (ref 2–3.5)
GLUCOSE BLD-MCNC: 92 MG/DL (ref 70–99)
GLUCOSE BLDC GLUCOMTR-MCNC: 89 MG/DL (ref 70–99)
GLUCOSE UR-MCNC: NORMAL MG/DL
HCT VFR BLD AUTO: 26.2 % (ref 39–53)
HGB BLD-MCNC: 7.9 G/DL (ref 13–17.5)
HGB UR QL STRIP.AUTO: NEGATIVE
IMM GRANULOCYTES # BLD AUTO: 0.03 X10(3) UL (ref 0–1)
IMM GRANULOCYTES NFR BLD: 0.3 %
INR BLD: 1.04 (ref 0.8–1.2)
KETONES UR-MCNC: NEGATIVE MG/DL
LEUKOCYTE ESTERASE UR QL STRIP.AUTO: NEGATIVE
LYMPHOCYTES # BLD AUTO: 2.42 X10(3) UL (ref 1–4)
LYMPHOCYTES NFR BLD AUTO: 24.2 %
MCH RBC QN AUTO: 21.2 PG (ref 26–34)
MCHC RBC AUTO-ENTMCNC: 30.2 G/DL (ref 31–37)
MCV RBC AUTO: 70.2 FL (ref 80–100)
MONOCYTES # BLD AUTO: 0.83 X10(3) UL (ref 0.1–1)
MONOCYTES NFR BLD AUTO: 8.3 %
NEUTROPHILS # BLD AUTO: 6.36 X10 (3) UL (ref 1.5–7.7)
NEUTROPHILS # BLD AUTO: 6.36 X10(3) UL (ref 1.5–7.7)
NEUTROPHILS NFR BLD AUTO: 63.5 %
NITRITE UR QL STRIP.AUTO: NEGATIVE
OSMOLALITY SERPL CALC.SUM OF ELEC: 284 MOSM/KG (ref 275–295)
P AXIS: 39 DEGREES
P-R INTERVAL: 164 MS
PH UR: 6 [PH] (ref 5–8)
PLATELET # BLD AUTO: 461 10(3)UL (ref 150–450)
POTASSIUM SERPL-SCNC: 3.3 MMOL/L (ref 3.5–5.1)
PROT SERPL-MCNC: 8.4 G/DL (ref 5.7–8.2)
PROT UR-MCNC: NEGATIVE MG/DL
PROTHROMBIN TIME: 14.2 SECONDS (ref 11.6–14.8)
Q-T INTERVAL: 402 MS
QRS DURATION: 90 MS
QTC CALCULATION (BEZET): 448 MS
R AXIS: 57 DEGREES
RBC # BLD AUTO: 3.73 X10(6)UL (ref 4.3–5.7)
RH BLOOD TYPE: POSITIVE
SODIUM SERPL-SCNC: 137 MMOL/L (ref 136–145)
SP GR UR STRIP: 1.02 (ref 1–1.03)
T AXIS: 42 DEGREES
TROPONIN I SERPL HS-MCNC: 3 NG/L (ref ?–53)
UROBILINOGEN UR STRIP-ACNC: NORMAL
VENTRICULAR RATE: 75 BPM
WBC # BLD AUTO: 10 X10(3) UL (ref 4–11)

## 2025-04-17 PROCEDURE — 93005 ELECTROCARDIOGRAM TRACING: CPT

## 2025-04-17 PROCEDURE — 86901 BLOOD TYPING SEROLOGIC RH(D): CPT | Performed by: EMERGENCY MEDICINE

## 2025-04-17 PROCEDURE — 93010 ELECTROCARDIOGRAM REPORT: CPT

## 2025-04-17 PROCEDURE — 99285 EMERGENCY DEPT VISIT HI MDM: CPT

## 2025-04-17 PROCEDURE — 86850 RBC ANTIBODY SCREEN: CPT | Performed by: EMERGENCY MEDICINE

## 2025-04-17 PROCEDURE — 84484 ASSAY OF TROPONIN QUANT: CPT | Performed by: EMERGENCY MEDICINE

## 2025-04-17 PROCEDURE — 36415 COLL VENOUS BLD VENIPUNCTURE: CPT

## 2025-04-17 PROCEDURE — 99284 EMERGENCY DEPT VISIT MOD MDM: CPT

## 2025-04-17 PROCEDURE — 86900 BLOOD TYPING SEROLOGIC ABO: CPT | Performed by: EMERGENCY MEDICINE

## 2025-04-17 PROCEDURE — 81003 URINALYSIS AUTO W/O SCOPE: CPT | Performed by: EMERGENCY MEDICINE

## 2025-04-17 PROCEDURE — 85610 PROTHROMBIN TIME: CPT | Performed by: EMERGENCY MEDICINE

## 2025-04-17 PROCEDURE — 82140 ASSAY OF AMMONIA: CPT | Performed by: EMERGENCY MEDICINE

## 2025-04-17 PROCEDURE — 85025 COMPLETE CBC W/AUTO DIFF WBC: CPT | Performed by: EMERGENCY MEDICINE

## 2025-04-17 PROCEDURE — 85730 THROMBOPLASTIN TIME PARTIAL: CPT | Performed by: EMERGENCY MEDICINE

## 2025-04-17 PROCEDURE — 70450 CT HEAD/BRAIN W/O DYE: CPT | Performed by: EMERGENCY MEDICINE

## 2025-04-17 PROCEDURE — 80053 COMPREHEN METABOLIC PANEL: CPT | Performed by: EMERGENCY MEDICINE

## 2025-04-17 PROCEDURE — 71045 X-RAY EXAM CHEST 1 VIEW: CPT | Performed by: EMERGENCY MEDICINE

## 2025-04-17 PROCEDURE — 82962 GLUCOSE BLOOD TEST: CPT

## 2025-04-17 PROCEDURE — 82272 OCCULT BLD FECES 1-3 TESTS: CPT

## 2025-04-17 NOTE — ED PROVIDER NOTES
Patient Seen in: [facility] Emergency Department    History     Chief Complaint   Patient presents with    Altered Mental Status    Dizziness       HPI    59-year-old male who is Romansh-speaking only and family at bedside translating presents here today because of confusion and dizziness has been on and off for the past 2 to 3 days.  Patient recently had outpatient blood work and had a hemoglobin of 7.9.  No signs of bleeding.  No change in his bowel movements.  Patient does have a previous history of alcohol abuse but has not drank in over 3 years.  No chest pain or shortness of breath    History reviewed. Past Medical History[1]    History reviewed. Past Surgical History[2]      Medications :  Prescriptions Prior to Admission[3]     Family History[4]    Smoking Status: Social Hx on file[5]    Constitutional and vital signs reviewed.      Social History and Family History elements reviewed from today, pertinent positives to the presenting problem noted.    Physical Exam     ED Triage Vitals [04/17/25 1000]   /63   Pulse 78   Resp 20   Temp 98.3 °F (36.8 °C)   Temp src Oral   SpO2 99 %   O2 Device None (Room air)       All measures to prevent infection transmission during my interaction with the patient were taken. Handwashing was performed prior to and after the exam.  Stethoscope and any equipment used during my examination was cleaned with super sani-cloth germicidal wipes following the exam.     Physical Exam  Vitals and nursing note reviewed.   Cardiovascular:      Rate and Rhythm: Normal rate.   Pulmonary:      Effort: Pulmonary effort is normal.   Abdominal:      Palpations: Abdomen is soft.      Comments: Rectal exam good tone, brown stool, guaiac negative   Skin:     General: Skin is warm and dry.   Neurological:      Mental Status: He is alert and oriented to person, place, and time.      Comments: No focal deficit         ED Course        Labs Reviewed   CBC WITH DIFFERENTIAL WITH PLATELET  - Abnormal; Notable for the following components:       Result Value    RBC 3.73 (*)     HGB 7.9 (*)     HCT 26.2 (*)     MCV 70.2 (*)     MCH 21.2 (*)     MCHC 30.2 (*)     RDW 16.6 (*)     .0 (*)     All other components within normal limits   COMP METABOLIC PANEL (14) - Abnormal; Notable for the following components:    Potassium 3.3 (*)     ALT 7 (*)     Alkaline Phosphatase 140 (*)     Total Protein 8.4 (*)     Globulin  4.0 (*)     All other components within normal limits   PROTHROMBIN TIME (PT) - Normal   PTT, ACTIVATED - Normal   AMMONIA, PLASMA - Normal   TROPONIN I HIGH SENSITIVITY - Normal   POCT GLUCOSE - Normal   URINALYSIS, ROUTINE   TYPE AND SCREEN    Narrative:     The following orders were created for panel order Type and screen.                  Procedure                               Abnormality         Status                                     ---------                               -----------         ------                                     ABORH (Blood Type)[[id number]]                               Final result                               Antibody Screen[[id number]]                                  Final result                                                 Please view results for these tests on the individual orders.   ABORH (BLOOD TYPE)   ANTIBODY SCREEN     EKG    Rate, intervals and axes as noted on EKG Report.  Rate: 75  Rhythm: Sinus Rhythm  Reading: Normal sinus rhythm, heart rate 75, normal intervals, normal axis           As Interpreted by me    Imaging Results Available and Reviewed while in ED: XR CHEST AP PORTABLE  (CPT=71045)  Result Date: 4/17/2025  CONCLUSION:   No focal opacity, pleural effusion, or pneumothorax.    Dictated by (CST): Jatinder Cody MD on 4/17/2025 at 11:36 AM     Finalized by (CST): Jatinder Cody MD on 4/17/2025 at 11:38 AM          CT BRAIN OR HEAD (CPT=70450)  Result Date: 4/17/2025  CONCLUSION:   No acute intracranial abnormality.  Paranasal  sinus disease as above.    Dictated by (CST): Juan Burns MD on 4/17/2025 at 11:20 AM     Finalized by (CST): Juan Burns MD on 4/17/2025 at 11:23 AM          ED Medications Administered: Medications - No data to display      MDM     Vitals:    04/17/25 1000 04/17/25 1030 04/17/25 1145 04/17/25 1200   BP: 108/63 105/70 118/69 112/70   Pulse: 78 72 75 76   Resp: 20 21  18   Temp: 98.3 °F (36.8 °C)      TempSrc: Oral      SpO2: 99% 98% 98% 99%   Weight: 61.2 kg      Height: 162.6 cm (5' 4\")        *I personally reviewed and interpreted all ED vitals.    Pulse Ox: 99%, Room air, Normal     Monitor Interpretation:   normal sinus rhythm as interpreted by me.  The cardiac monitor was ordered to monitor cardiac rate and rhythm.    Differential Diagnosis/ Diagnostic Considerations: Anemia, CVA, GI bleed, infection    Complicating Factors: The patient already has does not have any pertinent problems on file. to contribute to the complexity of this ED evaluation.    Medical Decision Making  Amount and/or Complexity of Data Reviewed  External Data Reviewed: labs and notes.     Details: Reviewed notes from patient's primary care provider help contribute to patient's medical history and reason he was sent here along with previous labs to compare today  Labs: ordered. Decision-making details documented in ED Course.  Radiology: ordered and independent interpretation performed. Decision-making details documented in ED Course.  ECG/medicine tests: ordered and independent interpretation performed. Decision-making details documented in ED Course.    I reviewed all results with patient and family members at the bedside translating.  Patient's hemoglobin is still 7.9.  No change from previous.  Rest of labs did not show anything acute.  Ammonia level within normal limits.  Troponin negative.  I reviewed the chest x-ray images myself there is nothing acute which was confirmed by radiologist.  EKG no acute changes.  CT  results from radiology showed nothing acute as well.  Patient is awake, alert, oriented x 4.  No focal deficits.  Unsure what causes confusion a few days ago.  I offered admission to further evaluate with neurology consultation.  Patient declined admission at this time.  Patient dates he feels fine and wants to go home and is refusing admission.  I explained patient should follow-up with neurologist.  Given referral call to schedule appoint.  Patient already has outpatient appoint with GI and he should keep that appointment due to his anemia.  Patient should follow-up with his primary care provider in 1 to 2 days for reevaluation.  Return to the ER if some continue, get worse, unable to follow-up    Condition upon leaving the department: Stable    Disposition and Plan     Clinical Impression:  1. Dizziness    2. Transient alteration of awareness    3. Anemia, unspecified type        Disposition:  Discharge    Follow-up:  [address]  [address]  [facility] [address] [phone]  Call  choose option 1 for general neurology    Dilcia Suresh MD  [address] [place] [address] [phone]    Follow up        Medications Prescribed:  Current Discharge Medication List                           [1] History reviewed. No pertinent past medical history.  [2] History reviewed. No pertinent surgical history.  [3] (Not in a hospital admission)   [4]   Family History  Problem Relation Age of Onset    Diabetes Mother     Diabetes Father     Diabetes Sister    [5]   Social History  Socioeconomic History    Marital status:    Tobacco Use    Smoking status: Never    Smokeless tobacco: Never   Vaping Use    Vaping status: Never Used   Substance and Sexual Activity    Alcohol use: Not Currently     Comment: on weekends only, last drink Monday/holiday    Drug use: Never

## 2025-04-17 NOTE — PROGRESS NOTES
ER follow-up appt  / Discharged 4/17 [facility]        PCP Request :  Dilcia Suresh MD  [facility] [address] [facility] [id number] [phone]        Attempt #1:  Left message on voicemail for patient to call transitions specialist back to schedule follow up appointments. Provided Transitions specialist scheduling phone number [phone].

## 2025-04-17 NOTE — TELEPHONE ENCOUNTER
Patients son is calling in stating ED needs blood transfusion order from .   Patient is currently at ED.    Patient has disoriented and weak for the past couple days.     Please call when order has been placed, if appropriate.

## 2025-04-17 NOTE — ED INITIAL ASSESSMENT (HPI)
Northern Irish speaking pt sent here by his doctor for blood transfusion.  Per pt and son pt has been confused x 4-5 days, appears pale.  A/OX 4,breathing unlabored.  Per son pt had blood work done 04/09/25 with Hemoglobin result of 7.9.

## 2025-04-17 NOTE — DISCHARGE INSTRUCTIONS
Follow-up with your primary care provider in 1 to 2 days.  Follow-up with neurology soon as possible call to schedule appoint.  Keep all your appointments with referrals to specialists including GI as already scheduled.  Return to the ER if some continue, get worse, unable to follow-up

## 2025-04-17 NOTE — TELEPHONE ENCOUNTER
Left a message on [employer]'s voice mail ( HIPAA authorized) son the emergency department physician will order blood transfusion dependent on his father's lab results and current condition. Dr. Ruffin would not order a blood transfusion for the emergency department.

## 2025-04-18 NOTE — PROGRESS NOTES
ER follow-up appt  / Discharged 4/17 [facility]           PCP Request :  Dilcia Suresh MD  [facility] [address] [facility] [id number] [phone]  DECLINED - pt's son Anand states that the famil has been in communication wit the pt's PCP and right now he has specialty appts that he will attend and will follow-up with Dr. Ruffin at a later date        Closing encounter

## 2025-04-28 ENCOUNTER — OFFICE VISIT (OUTPATIENT)
Age: 60
End: 2025-04-28
Attending: INTERNAL MEDICINE
Payer: COMMERCIAL

## 2025-04-28 VITALS
OXYGEN SATURATION: 97 % | HEART RATE: 81 BPM | DIASTOLIC BLOOD PRESSURE: 64 MMHG | TEMPERATURE: 98 F | RESPIRATION RATE: 16 BRPM | HEIGHT: 64 IN | SYSTOLIC BLOOD PRESSURE: 103 MMHG | WEIGHT: 132 LBS | BODY MASS INDEX: 22.53 KG/M2

## 2025-04-28 DIAGNOSIS — R63.4 UNINTENTIONAL WEIGHT LOSS: ICD-10-CM

## 2025-04-28 DIAGNOSIS — R63.0 LOSS OF APPETITE: ICD-10-CM

## 2025-04-28 DIAGNOSIS — D50.0 IRON DEFICIENCY ANEMIA DUE TO CHRONIC BLOOD LOSS: Primary | ICD-10-CM

## 2025-04-28 NOTE — CONSULTS
[facility] Hematology Oncology  Consultation Note    Patient Name: Rah Leonard   YOB: 1965   Medical Record Number: [id number]   CSN: [id number]   Consulting Physician: Veronica Young MD  Referring Physician(s): Dilcia Thomas  Date of Consultation: 4/28/2025     Reason for Consultation:    Encounter Diagnoses   Name Primary?    Iron deficiency anemia due to chronic blood loss Yes    Unintentional weight loss     Loss of appetite        History of Present Illness:   Rah Mendoza a 59 year old Syriac speaking male with history of alcohol and cocaine abuse, referred for evaluation of severe anemia and unintentional weight loss. He is accompanied by his son who is assisting in translation.   Patient reports progressively worsening headaches and insomnia over the past 1-2 years, presented to ER on 4/17/25 with dizziness, confusion and memory loss found to have Hgb 7.9, WBC 10, . CT head showed no acute abnormality and CXR was unremarkable. He also complains of increased lethargy, generalized weakness, poor appetite, and unintentional weight loss. He lost about 40 lb x1 year. He was started on mirtazapine 15 mg at bedtime but has not noticed any improvement.   Never had any cancer screening. He denies changes in bowel movements, melena, hematochezia or hematuria. He has history of chronic sinusitis and experiences frequent nosebleeds multiple times per month. He denies smoking, but had history of heavy alcohol use for more x35 years, quit drinking in 2022.     Performance Status: ECOG 1    Past Medical History:  Past Medical History[1]    Past Surgical History:  Past Surgical History[2]    Family Medical History:  Family History[3]      Psychosocial History:  Social History     Socioeconomic History    Marital status:      Spouse name: Not on file    Number of children: Not on file    Years of education: Not on file    Highest education level: Not on file   Occupational  History    Not on file   Tobacco Use    Smoking status: Never    Smokeless tobacco: Never   Vaping Use    Vaping status: Never Used   Substance and Sexual Activity    Alcohol use: Not Currently     Comment: on weekends only, last drink Monday/holiday    Drug use: Never    Sexual activity: Not on file   Other Topics Concern    Not on file   Social History Narrative    Not on file     Social Drivers of Health     Food Insecurity: Not on file   Transportation Needs: Not on file   Housing Stability: Not on file       Allergies:   Allergies[4]    Current Medications:  Medications - Current[5]    Review of Systems:  A comprehensive 10-point ROS completed all negative unless otherwise documented in HPI.     Vital Signs:  /64 (BP Location: Left arm, Patient Position: Sitting, Cuff Size: adult)   Pulse 81   Temp 98.1 °F (36.7 °C) (Oral)   Resp 16   Ht 1.626 m (5' 4\")   Wt 59.9 kg (132 lb)   SpO2 97%   BMI 22.66 kg/m²     Wt Readings from Last 6 Encounters:   04/28/25 59.9 kg (132 lb)   04/17/25 61.2 kg (135 lb)   04/09/25 61.7 kg (136 lb)   05/29/19 79.7 kg (175 lb 11.2 oz)      Physical Examination:  General: Alert and oriented x 3, not in acute distress. Thin.   HEENT: Non-icteric sclera. Oropharynx is clear.   Neck: No palpable lymphadenopathy. Neck is supple.  Chest: Clear to auscultation.  Heart: Regular rate and rhythm. S1 S2 normal.   Abdomen: Soft, non tender, non distended with good bowel sounds.  Extremities: Pedal pulses are present. No edema.  Neurological: Grossly intact.   Lymphatics: No palpable lymphadenopathy in the cervical, supraclavicular, axillary, or inguinal regions.  Psych/Depression: Appropriate mood and affect.     Laboratory:    Lab Results   Component Value Date    WBC 10.0 04/17/2025    RBC 3.73 (L) 04/17/2025    HGB 7.9 (L) 04/17/2025    HCT 26.2 (L) 04/17/2025    MCV 70.2 (L) 04/17/2025    MCH 21.2 (L) 04/17/2025    MCHC 30.2 (L) 04/17/2025    RDW 16.6 (H) 04/17/2025    .0  (H) 04/17/2025     Lab Results   Component Value Date     04/17/2025    K 3.3 (L) 04/17/2025     04/17/2025    CO2 22.0 04/17/2025    BUN 14 04/17/2025    CREATSERUM 0.97 04/17/2025    GLU 92 04/17/2025    CA 9.0 04/17/2025    ALKPHO 140 (H) 04/17/2025    ALT 7 (L) 04/17/2025    AST 18 04/17/2025    BILT 0.3 04/17/2025    ALB 4.4 04/17/2025    TP 8.4 (H) 04/17/2025     Lab Results   Component Value Date/Time    ANTHONY 3 (L) 04/09/2025 04:21 PM     Lab Results   Component Value Date/Time    SAT 4 (L) 04/09/2025 04:21 PM       Radiology:  XR CHEST AP PORTABLE  (CPT=71045)  Result Date: 4/17/2025  CONCLUSION:   No focal opacity, pleural effusion, or pneumothorax.    Dictated by (CST): Jatinder Cody MD on 4/17/2025 at 11:36 AM     Finalized by (CST): Jatinder Cody MD on 4/17/2025 at 11:38 AM          CT BRAIN OR HEAD (CPT=70450)  Result Date: 4/17/2025  CONCLUSION:   No acute intracranial abnormality.  Paranasal sinus disease as above.    Dictated by (CST): Juan Burns MD on 4/17/2025 at 11:20 AM     Finalized by (CST): Juan Burns MD on 4/17/2025 at 11:23 AM               Impression & Plan     59 year old male with severe anemia due to iron deficiency given the extremely low iron stores and saturation. Causes of iron deficiency discussed including acute or chronic blood loss (colon, hematuria, menses), iron malabsorption (GI surgery), decrease nutritional intake (diet, IBD), or hemoglobinopathy. ARSH is probably due to chronic GI bleed vs poor dietary intake/malabsorption.   Given the degree of his severe and symptomatic anemia, I recommend  IV iron infusions with injectafer 750mg x 2. He was referred to GI for endoscopic work up but has not scheduled the appointment yet. I advised to schedule the consultation ASAP.  We will also proceed with CT C/A/P to rule out any malignancy given his anorexia and significant weight loss.   Return in about 2 months (around 6/28/2025). With labs to assess his  response or sooner if necessary depending on the CT scan and endoscopies results.       Thank you for the consultation request and the opportunity to participate in the care of Rah Leonard. We will continue to follow and provide further recommendations. Please contact me for any questions or concerns.         Veronica Young MD   [facility] Hematology Oncology            [1] No past medical history on file.  [2] No past surgical history on file.  [3]   Family History  Problem Relation Age of Onset    Diabetes Mother     Diabetes Father     Diabetes Sister    [4] No Known Allergies  [5]   Current Outpatient Medications:     metFORMIN  MG Oral Tablet 24 Hr, Take 1 tablet (500 mg total) by mouth daily., Disp: 90 tablet, Rfl: 1    topiramate (TOPAMAX) 50 MG Oral Tab, Take 1 tablet (50 mg total) by mouth daily., Disp: 90 tablet, Rfl: 0    mirtazapine 15 MG Oral Tab, Take 1 tablet (15 mg total) by mouth nightly., Disp: 90 tablet, Rfl: 0    fluticasone propionate 50 MCG/ACT Nasal Suspension, 2 sprays by Each Nare route daily., Disp: 1 each, Rfl: 0    levocetirizine 5 MG Oral Tab, Take 1 tablet (5 mg total) by mouth nightly as needed for Allergies., Disp: 90 tablet, Rfl: 0

## 2025-05-08 ENCOUNTER — TELEPHONE (OUTPATIENT)
Age: 60
End: 2025-05-08

## 2025-05-08 NOTE — TELEPHONE ENCOUNTER
Called patient to schedule appointment - first attempt.  Left message to call back.   utilized:   Language: Nepali   Name: EILEEN   ID#: [id number]

## 2025-05-15 ENCOUNTER — OFFICE VISIT (OUTPATIENT)
Age: 60
End: 2025-05-15
Attending: INTERNAL MEDICINE
Payer: COMMERCIAL

## 2025-05-15 VITALS
SYSTOLIC BLOOD PRESSURE: 98 MMHG | WEIGHT: 133 LBS | HEART RATE: 69 BPM | OXYGEN SATURATION: 97 % | RESPIRATION RATE: 16 BRPM | TEMPERATURE: 99 F | BODY MASS INDEX: 23 KG/M2 | DIASTOLIC BLOOD PRESSURE: 60 MMHG

## 2025-05-15 DIAGNOSIS — D50.0 IRON DEFICIENCY ANEMIA DUE TO CHRONIC BLOOD LOSS: Primary | ICD-10-CM

## 2025-05-15 NOTE — PROGRESS NOTES
Patient arrives to infusion for Injectafer dose 1 of 2. Oriented patient to infusion area and reviewed IV iron administration. Patient denies any issues or concerns. Discussed possible side effects and s/s of adverse reaction.         Component  Ref Range & Units (hover) 4/9/25 1621   Ferritin 3           Component  Ref Range & Units (hover) 4/9/25 1621   Iron 15    Transferrin 320   Total Iron Binding Capacity 407   % Saturation 4            Ordering Provider: Veronica Young MD  Order Exp: after 2 doses     Patient appeared to tolerate infusion without difficulty or complaint. No s/s of adverse reaction noted. Post infusion VSS. Reviewed next apt date/time: 5/23 4pm    Printed AVS provided.     Education Record  Learner:  Patient  Disease / Diagnosis: ARSH  Barriers / Limitations:  None  Method:  Reinforcement  General Topics:  Side effects and symptom management and Plan of care reviewed  Outcome:  Shows understanding

## 2025-05-19 ENCOUNTER — HOSPITAL ENCOUNTER (OUTPATIENT)
Dept: CT IMAGING | Facility: HOSPITAL | Age: 60
Discharge: HOME | End: 2025-05-19
Attending: FAMILY MEDICINE
Payer: COMMERCIAL

## 2025-05-19 DIAGNOSIS — G43.701 CHRONIC MIGRAINE WITHOUT AURA WITH STATUS MIGRAINOSUS, NOT INTRACTABLE: ICD-10-CM

## 2025-05-19 PROCEDURE — 70450 CT HEAD/BRAIN W/O DYE: CPT | Performed by: FAMILY MEDICINE

## 2025-05-23 ENCOUNTER — OFFICE VISIT (OUTPATIENT)
Age: 60
End: 2025-05-23
Attending: INTERNAL MEDICINE
Payer: COMMERCIAL

## 2025-05-23 VITALS
OXYGEN SATURATION: 98 % | RESPIRATION RATE: 16 BRPM | DIASTOLIC BLOOD PRESSURE: 44 MMHG | SYSTOLIC BLOOD PRESSURE: 98 MMHG | HEART RATE: 71 BPM | TEMPERATURE: 98 F

## 2025-05-23 DIAGNOSIS — G43.701 CHRONIC MIGRAINE WITHOUT AURA WITH STATUS MIGRAINOSUS, NOT INTRACTABLE: ICD-10-CM

## 2025-05-23 DIAGNOSIS — D50.0 IRON DEFICIENCY ANEMIA DUE TO CHRONIC BLOOD LOSS: Primary | ICD-10-CM

## 2025-05-23 NOTE — PROGRESS NOTES
Pt here for injectafer 2 of 2 . Pt denies any issues or concerns regarding previous injectafer infusion. He does report a headache that he says is normal for him.     Pt tolerated infusion without difficulty or complaint.       Education Record  Learner:  Patient  Disease / Diagnosis: ARSH  Barriers / Limitations:  None  Method:  Discussion  General Topics:  Plan of care reviewed  Outcome:  Shows understanding

## 2025-05-27 RX ORDER — TOPIRAMATE 50 MG/1
50 TABLET, FILM COATED ORAL DAILY
Qty: 90 TABLET | Refills: 1 | Status: SHIPPED | OUTPATIENT
Start: 2025-05-27

## 2025-05-27 NOTE — TELEPHONE ENCOUNTER
Chronic migraine without aura with status migrainosus, not intractable  -     CT BRAIN OR HEAD (CPT=93195); Future  -     topiramate (TOPAMAX) 50 MG Oral Tab; Take 1 tablet (50 mg total) by mouth daily.  -susepct secondary to poor nutrition and insomnia  -obtain CT  -topamax trial. SE discussed

## 2025-06-19 ENCOUNTER — OFFICE VISIT (OUTPATIENT)
Dept: OTOLARYNGOLOGY | Facility: CLINIC | Age: 60
End: 2025-06-19

## 2025-06-19 VITALS — BODY MASS INDEX: 25 KG/M2 | WEIGHT: 143 LBS

## 2025-06-19 DIAGNOSIS — J31.0 ATROPHIC RHINITIS: Primary | ICD-10-CM

## 2025-06-19 RX ORDER — CYCLOBENZAPRINE HCL 5 MG
5 TABLET ORAL NIGHTLY
Qty: 30 TABLET | Refills: 1 | Status: SHIPPED | OUTPATIENT
Start: 2025-06-19

## 2025-06-19 RX ORDER — ACETAMINOPHEN 325 MG/1
TABLET ORAL
COMMUNITY

## 2025-06-19 RX ORDER — MELOXICAM 7.5 MG/1
TABLET ORAL
COMMUNITY

## 2025-06-19 RX ORDER — CELECOXIB 200 MG/1
200 CAPSULE ORAL DAILY PRN
Qty: 30 CAPSULE | Refills: 0 | Status: SHIPPED | OUTPATIENT
Start: 2025-06-19 | End: 2025-07-19

## 2025-07-02 ENCOUNTER — APPOINTMENT (OUTPATIENT)
Facility: LOCATION | Age: 60
End: 2025-07-02
Attending: INTERNAL MEDICINE

## 2025-07-26 DIAGNOSIS — R04.0 EPISTAXIS: ICD-10-CM

## 2025-07-26 DIAGNOSIS — F51.04 PSYCHOPHYSIOLOGICAL INSOMNIA: ICD-10-CM

## 2025-07-28 RX ORDER — CELECOXIB 200 MG/1
200 CAPSULE ORAL DAILY PRN
Qty: 30 CAPSULE | Refills: 0 | Status: SHIPPED | OUTPATIENT
Start: 2025-07-28

## 2025-07-28 NOTE — TELEPHONE ENCOUNTER
Medication Refill request for Celecoxib, patient asked to follow up in 1 month, will need an appt for next refill request  Last office visit:  6/19/25

## 2025-07-29 RX ORDER — LEVOCETIRIZINE DIHYDROCHLORIDE 5 MG/1
5 TABLET, FILM COATED ORAL NIGHTLY PRN
Qty: 90 TABLET | Refills: 3 | Status: SHIPPED | OUTPATIENT
Start: 2025-07-29

## 2025-07-29 RX ORDER — MIRTAZAPINE 15 MG/1
15 TABLET, FILM COATED ORAL NIGHTLY
Qty: 90 TABLET | Refills: 0 | Status: SHIPPED | OUTPATIENT
Start: 2025-07-29

## (undated) NOTE — LETTER
[address] [facility], [facility] [address]     I agree to have a Peripherally Inserted Central Catheter (PICC) placed in my arm.      1. The PICC insertion procedure, care, maintenance, risks, benefits, and complications Statement of Physician: My signature below affirms that prior to the time of the PICC line insertion, I have explained to the patient and/or his/her legal representative, the risks and benefits involved in the proposed treatment and any reasonable alternat

## (undated) NOTE — LETTER
Dilcia Suresh Md  [address] [place] [id number]       06/20/25        Patient: Rah Leonard   YOB: 1965   Date of Visit: 6/19/2025       Dear  Dr. Augustin Thomas MD,      Thank you for referring Rah Leonard to my practice.  Please find my assessment and plan below.    ASSESSMENT AND PLAN    1. Atrophic rhinitis  Complete loss of the septum due to probable use of cocaine and other illicits intranasally.  Nasal endoscopy reveals a very large intranasal cavity.  I suspect atrophic rhinitis due to mucosal changes intranasally.  I have recommended nasal lavage and use of Ponaris and perhaps even Vaseline intranasally bilaterally to limit his crusting.  I did specifically warn him against digitizing intranasally as he may produce infection and from that activity.  With respect to his bitemporal pain and discomfort this may be TMJ related he does have worn teeth on examination bilateral TMJ tenderness.  Start Celebrex cyclobenzaprine warm heat soft diet and chewing both sides of mouth and he will return to see me in 1 month for reevaluation.               Sincerely,   Lee Young MD   [facility], [facility], [place]  [address] [id number]    Document electronically generated by:  Lee Young MD